# Patient Record
Sex: FEMALE | Race: WHITE | Employment: FULL TIME | URBAN - METROPOLITAN AREA
[De-identification: names, ages, dates, MRNs, and addresses within clinical notes are randomized per-mention and may not be internally consistent; named-entity substitution may affect disease eponyms.]

---

## 2021-04-29 ENCOUNTER — OFFICE VISIT (OUTPATIENT)
Dept: NEUROLOGY | Age: 31
End: 2021-04-29
Payer: COMMERCIAL

## 2021-04-29 VITALS
OXYGEN SATURATION: 97 % | RESPIRATION RATE: 18 BRPM | HEIGHT: 65 IN | BODY MASS INDEX: 22.69 KG/M2 | HEART RATE: 92 BPM | SYSTOLIC BLOOD PRESSURE: 122 MMHG | WEIGHT: 136.2 LBS | DIASTOLIC BLOOD PRESSURE: 74 MMHG

## 2021-04-29 DIAGNOSIS — G43.719 INTRACTABLE CHRONIC MIGRAINE WITHOUT AURA AND WITHOUT STATUS MIGRAINOSUS: Primary | ICD-10-CM

## 2021-04-29 PROCEDURE — 99204 OFFICE O/P NEW MOD 45 MIN: CPT | Performed by: SPECIALIST

## 2021-04-29 RX ORDER — METHYLPHENIDATE HYDROCHLORIDE 18 MG/1
36 TABLET ORAL EVERY MORNING
COMMUNITY
Start: 2021-02-11

## 2021-04-29 RX ORDER — NORTRIPTYLINE HYDROCHLORIDE 25 MG/1
50 CAPSULE ORAL AT BEDTIME
COMMUNITY
Start: 2020-11-24

## 2021-04-29 RX ORDER — CHOLECALCIFEROL (VITAMIN D3) 50 MCG
CAPSULE ORAL DAILY
COMMUNITY

## 2021-04-29 RX ORDER — ONABOTULINUMTOXINA 200 [USP'U]/1
INJECTION, POWDER, LYOPHILIZED, FOR SOLUTION INTRADERMAL; INTRAMUSCULAR
COMMUNITY
End: 2021-05-12 | Stop reason: SDUPTHER

## 2021-04-29 RX ORDER — PROCHLORPERAZINE MALEATE 10 MG
10 TABLET ORAL
COMMUNITY

## 2021-04-29 RX ORDER — METHYLPHENIDATE HYDROCHLORIDE 10 MG/1
10 TABLET ORAL
COMMUNITY
Start: 2020-12-07

## 2021-04-29 RX ORDER — KETOROLAC TROMETHAMINE 10 MG/1
10 TABLET, FILM COATED ORAL
COMMUNITY
Start: 2020-07-08

## 2021-04-29 RX ORDER — SUMATRIPTAN 6 MG/.5ML
6 INJECTION, SOLUTION SUBCUTANEOUS AS NEEDED
COMMUNITY
Start: 2020-05-07

## 2021-04-29 NOTE — PROGRESS NOTES
Neurology Consult      Subjective:      Alex Jones is a 33 y.o. female who comes in today as a local acquaintance who is interested in resuming her Botox injection series. Has moved from [facility] and by virtue of her migraine history ended up with Botox as a great fit. Said that she was at [facility] for a while and then in [place] had the prerequisites for authorization satisfied. This was the case since 4 years ago. Finds that she now gets about 15 headaches a month but they are only grade 1 or 2 and certainly doable. Is also on nortriptyline 25 mg 2 at night. Takes Nurtec as a rescue and sumatriptan injectable as needed and Compazine 10 mg as needed. Has magnesium and vitamin B2 and occasional feverfew as needed. Interestingly her headaches originally call sinus type and this was eventually sorted out his migraine. Headaches can be generalized throbbing without nausea vomiting. Enhanced by alcohol and caffeine and diminished with her standard state preventative and rescue remedies. Has had previous head CT scans all normal and think she probably had a brain MRI as well again unrevealing. Does have occasional intersections with her headaches and allergies and has a regular exercise routine and I understand that her sleep and stress is otherwise under control. Current Outpatient Medications   Medication Sig Dispense Refill    methylphenidate ER 18 mg 24 hr tab Take 36 mg by mouth Every morning.  methylphenidate HCl (RITALIN) 10 mg tablet Take 10 mg by mouth.  drospirenone, contraceptive, 4 mg (28) tab Take 4 mg by mouth daily.  nortriptyline (PAMELOR) 25 mg capsule Take 50 mg by mouth At bedtime.  rimegepant (NURTEC) 75 mg disintegrating tablet Take 75 mg by mouth as needed.  ketorolac (TORADOL) 10 mg tablet Take 10 mg by mouth every six (6) hours as needed.  SUMAtriptan (IMITREX) 6 mg/0.5 mL injection 6 mg by SubCUTAneous route as needed.       prochlorperazine (COMPAZINE) 10 mg tablet Take 10 mg by mouth every six (6) hours as needed for Nausea or Vomiting.  B2-magnesium cit,oxid-feverfew 200-180-50 mg tab Take  by mouth two (2) times a day.  B.infantis-B.ani-B.long-B.bifi (Probiotic 4X) 10-15 mg TbEC Take  by mouth daily.  ginger root (GINGER EXTRACT PO) Take  by mouth as needed.  onabotulinumtoxinA (Botox) 200 unit injection by IntraMUSCular route every three (3) months.  erenumab-aooe (AIMOVIG) 140 mg/mL injection 1 mL by SubCUTAneous route every thirty (30) days. 1 Syringe 5      Allergies   Allergen Reactions    House Dust Mite Not Reported This Time     Past Medical History:   Diagnosis Date    ADHD     Migraines       Past Surgical History:   Procedure Laterality Date    HX GI  2008    HX WISDOM TEETH EXTRACTION  2004      Social History     Socioeconomic History    Marital status:      Spouse name: Not on file    Number of children: Not on file    Years of education: Not on file    Highest education level: Not on file   Occupational History    Not on file   Social Needs    Financial resource strain: Not on file    Food insecurity     Worry: Not on file     Inability: Not on file    Transportation needs     Medical: Not on file     Non-medical: Not on file   Tobacco Use    Smoking status: Never Smoker    Smokeless tobacco: Never Used   Substance and Sexual Activity    Alcohol use:  Yes     Alcohol/week: 4.0 standard drinks     Types: 4 Glasses of wine per week    Drug use: Not on file    Sexual activity: Not on file   Lifestyle    Physical activity     Days per week: Not on file     Minutes per session: Not on file    Stress: Not on file   Relationships    Social connections     Talks on phone: Not on file     Gets together: Not on file     Attends Shinto service: Not on file     Active member of club or organization: Not on file     Attends meetings of clubs or organizations: Not on file Relationship status: Not on file    Intimate partner violence     Fear of current or ex partner: Not on file     Emotionally abused: Not on file     Physically abused: Not on file     Forced sexual activity: Not on file   Other Topics Concern    Not on file   Social History Narrative    Not on file      Family History   Problem Relation Age of Onset    Headache Mother     Migraines Mother     Heart Disease Father     Cancer Maternal Grandmother     Heart Disease Paternal Grandfather       Visit Vitals  /74   Pulse 92   Resp 18   Ht 5' 5\" (1.651 m)   Wt 61.8 kg (136 lb 3.2 oz)   SpO2 97%   BMI 22.66 kg/m²        Review of Systems:   A comprehensive review of systems was negative except for that written in the HPI. Neuro Exam:     Appearance: The patient is well developed, well nourished, provides a coherent history and is in no acute distress. Mental Status: Oriented to time, place and person. Mood and affect appropriate. Cranial Nerves:   Intact visual fields. Fundi are benign. NBA, EOM's full, no nystagmus, no ptosis. Facial sensation is normal. Corneal reflexes are intact. Facial movement is symmetric. Hearing is normal bilaterally. Palate is midline with normal sternocleidomastoid and trapezius muscles are normal. Tongue is midline. Motor:  5/5 strength in upper and lower proximal and distal muscles. Normal bulk and tone. No fasciculations. Reflexes:   Deep tendon reflexes 2+/4 and symmetrical.   Sensory:   Normal to touch, pinprick and vibration. Gait:  Normal gait. Tremor:   No tremor noted. Cerebellar:  No cerebellar signs present. Neurovascular:  Normal heart sounds and regular rhythm, peripheral pulses intact, and no carotid bruits. Assessment:   Chronic migraine headaches intractable not status migrainosus. Will renew the Aimovig 140 mg by request.  Will pend revisit until her next Botox injection series I believe sometime in June?   Otherwise get good sleep minimize stress and exercise could help. Please see above dictation. Plan:   Revisit pended.   Signed by :  Calixto Nguyen MD

## 2021-04-29 NOTE — LETTER
4/29/2021 Patient: Bri Pretty YOB: 1990 Date of Visit: 4/29/2021 Jacek Mendoza MD 
[address] [address] Via Fax: [phone] Dear Jacek Mendoza MD, Thank you for referring Ms. Bri Pretty to [facility] for evaluation. My notes for this consultation are attached. If you have questions, please do not hesitate to call me. I look forward to following your patient along with you.  
 
 
Sincerely, 
 
Nory Giraldo MD

## 2021-04-29 NOTE — PATIENT INSTRUCTIONS
Patient with established migraine headache history and best relief with Botox as prevention.   Will pend revisit until her next injection series and renew the Aimovig by request.

## 2021-06-01 ENCOUNTER — TELEPHONE (OUTPATIENT)
Dept: NEUROLOGY | Age: 31
End: 2021-06-01

## 2021-06-02 ENCOUNTER — TELEPHONE (OUTPATIENT)
Dept: NEUROLOGY | Age: 31
End: 2021-06-02

## 2021-06-10 NOTE — TELEPHONE ENCOUNTER
----- Message from Shanthi Nye sent at 6/10/2021 11:04 AM EDT -----  Regarding: Dr. Jhony Liu  General Message/Vendor Calls    Caller's first and last name: Pt      Reason for call: requesting botox appointment      Callback required yes/no and why: Yes      Best contact number(s): [phone]      Details to clarify the request: Pt is calling to find out when she will be contacted to get scheduled for her botox injection. Please advise.        Shanthi Nye

## 2021-06-10 NOTE — TELEPHONE ENCOUNTER
Spoke to patient scheduled for botox       STATUS CHANGED CPT 84532 TO BE BILLED UNDER [facility] FACILITY BY TELEPHONE AT 3 [phone]  [facility] REF# [id number]  EFFECTIVE 5-5-21 TO 5-4-22

## 2021-06-24 ENCOUNTER — OFFICE VISIT (OUTPATIENT)
Dept: NEUROLOGY | Age: 31
End: 2021-06-24
Payer: COMMERCIAL

## 2021-06-24 VITALS
DIASTOLIC BLOOD PRESSURE: 70 MMHG | BODY MASS INDEX: 28.31 KG/M2 | OXYGEN SATURATION: 99 % | HEIGHT: 65 IN | RESPIRATION RATE: 18 BRPM | WEIGHT: 169.9 LBS | SYSTOLIC BLOOD PRESSURE: 118 MMHG | HEART RATE: 76 BPM

## 2021-06-24 DIAGNOSIS — G43.719 INTRACTABLE CHRONIC MIGRAINE WITHOUT AURA AND WITHOUT STATUS MIGRAINOSUS: Primary | ICD-10-CM

## 2021-06-24 PROCEDURE — 64615 CHEMODENERV MUSC MIGRAINE: CPT | Performed by: SPECIALIST

## 2021-06-24 RX ORDER — CHOLECALCIFEROL TAB 125 MCG (5000 UNIT) 125 MCG
TAB ORAL
COMMUNITY

## 2021-06-24 NOTE — PROGRESS NOTES
This patient had BotoxA  administered for 31 injections all intramuscular and with the exception of one injection, the procerus, they were right and left-sided. The medicine was reconstituted as Botox A 200 units with 4cc of 0.9% normal saline without preservative. The ensuing mixture was 5 units per 1/10CC and administered intramuscularly. The  muscles x2 for a total of 10 units, the procerus muscle x1 per 5 units, the frontalis muscle x4 for 20 units, the temporalis muscles x8 for 40 units,  Occipitalis muscles x6 for 30 units, the cervical paraspinals x4 for 20 units, and finally the trapezius muscles x6 for a total of 30 units. This totalled 155 units of Botox A with 45 units wastage. Patient tolerated the procedure without any ill effects. It is recommended that she keep her head upright for the next 4 hours.       [facility]  OFFICE PROCEDURE PROGRESS NOTE        Chart reviewed for the following:   Birdie Jo MD, have reviewed the History, Physical and updated the Allergic reactions for Ileana Rubi     TIME OUT performed immediately prior to start of procedure:   I, Margaret Barth MD, have performed the following reviews on Ileana Rubi prior to the start of the procedure:            * Patient was identified by name and date of birth   * Agreement on procedure being performed was verified  * Risks and Benefits explained to the patient  * Procedure site verified and marked as necessary  * Patient was positioned for comfort  * Consent was signed and verified     Time: 1009 AM      Date of procedure: 6/24/2021    Procedure performed by:  Margaret Barth MD    Provider assisted by: n/a    Patient assisted by:n/a    How tolerated by patient:tolerated    Post Procedural Pain Scale: 2 - Hurts Little Bit    Comments: none

## 2021-06-24 NOTE — PROGRESS NOTES
Identified pt with two pt identifiers(name and ). Reviewed record in preparation for visit and have obtained necessary documentation. Chief Complaint   Patient presents with    Other     [facility]        Health Maintenance Due   Topic    Hepatitis C Screening     COVID-19 Vaccine (1)    PAP AKA CERVICAL CYTOLOGY        Visit Vitals  /70   Pulse 76   Resp 18   Ht 5' 5\" (1.651 m)   Wt 169 lb 14.4 oz (77.1 kg)   SpO2 99%   BMI 28.27 kg/m²         Coordination of Care Questionnaire:  :   1) Have you been to an emergency room, urgent care, or hospitalized since your last visit? If yes, where when, and reason for visit? NO      2. Have seen or consulted any other health care provider since your last visit? If yes, where when, and reason for visit? NO      Patient is accompanied by SELF  I have received verbal consent from Haley Diaz to discuss any/all medical information while they are present in the room.

## 2021-06-24 NOTE — PROGRESS NOTES
Patient says there was a period of several weeks where her headaches were less than optimal.  Critical look at her dietary discretions revealed that she needed to make some changes and she did so the headaches went from a 5/10 to a 1 or 2/10. That is pretty much where she settled down. No new difficulties and no downside to taking the Botox. We will see her back in 3 months. Taking her lead we omitted the injections over the right  muscle and the forehead injection above that point of reference. Apparently in a previous Botox encounter she had some difficulties with right brow and lid lag difficulties.

## 2021-06-24 NOTE — LETTER
6/24/2021    Patient: Rufino Shoulders   YOB: 1990   Date of Visit: 6/24/2021     Patricia Chino, [address] [place] [id number]  Via Fax: [phone]    Dear Patricia Chino MD,      Thank you for referring Ms. Joy Shoulders to [facility] for evaluation. My notes for this consultation are attached. If you have questions, please do not hesitate to call me. I look forward to following your patient along with you.       Sincerely,    Galen Nielson MD

## 2021-08-19 ENCOUNTER — TELEPHONE (OUTPATIENT)
Dept: NEUROLOGY | Age: 31
End: 2021-08-19

## 2021-09-20 ENCOUNTER — DOCUMENTATION ONLY (OUTPATIENT)
Dept: NEUROLOGY | Age: 31
End: 2021-09-20

## 2023-02-03 ENCOUNTER — HOSPITAL ENCOUNTER (OUTPATIENT)
Dept: GENERAL RADIOLOGY | Age: 33
Discharge: HOME | End: 2023-02-03
Payer: COMMERCIAL

## 2023-02-03 DIAGNOSIS — M54.10 RADICULOPATHY AFFECTING UPPER EXTREMITY: ICD-10-CM

## 2023-02-03 PROCEDURE — 72040 X-RAY EXAM NECK SPINE 2-3 VW: CPT
